# Patient Record
Sex: FEMALE
[De-identification: names, ages, dates, MRNs, and addresses within clinical notes are randomized per-mention and may not be internally consistent; named-entity substitution may affect disease eponyms.]

---

## 2022-01-05 ENCOUNTER — HOSPITAL ENCOUNTER (OUTPATIENT)
Dept: HOSPITAL 5 - TRG | Age: 27
LOS: 1 days | Discharge: HOME | End: 2022-01-06
Attending: OBSTETRICS & GYNECOLOGY
Payer: COMMERCIAL

## 2022-01-05 VITALS — SYSTOLIC BLOOD PRESSURE: 120 MMHG | DIASTOLIC BLOOD PRESSURE: 79 MMHG

## 2022-01-05 DIAGNOSIS — O26.893: Primary | ICD-10-CM

## 2022-01-05 DIAGNOSIS — Z3A.30: ICD-10-CM

## 2022-01-05 DIAGNOSIS — R10.9: ICD-10-CM

## 2022-01-05 PROCEDURE — 59025 FETAL NON-STRESS TEST: CPT

## 2022-01-05 PROCEDURE — 76819 FETAL BIOPHYS PROFIL W/O NST: CPT

## 2022-01-06 NOTE — ULTRASOUND REPORT
ULTRASOUND FETAL BIOPHYSICAL PROFILE



INDICATION / CLINICAL INFORMATION: decreased fetal movement.

Clinical Gestational Age (GA) in weeks, days: 30 weeks 5 days 



TECHNIQUE: Transabdominal.



COMPARISON: None available.



FINDINGS:



FETAL BREATHING MOVEMENT = 2

GROSS BODY MOVEMENT = 2

FETAL TONE = 2

QUALITATIVE AMNIOTIC FLUID VOLUME = 2



TOTAL BIOPHYSICAL SCORE = 8/8



FETAL HEART RATE (beats per minute): 137 



PRESENTATION: Cephalic. 



ADDITIONAL FINDINGS: None.



IMPRESSION:

1. Fetal Biophysical Score = 8/8



Signer Name: Marly Flores MD 

Signed: 1/6/2022 1:08 AM

Workstation Name: [id number]

## 2022-01-30 ENCOUNTER — HOSPITAL ENCOUNTER (OUTPATIENT)
Dept: HOSPITAL 5 - TRG | Age: 27
Discharge: HOME | End: 2022-01-30
Attending: OBSTETRICS & GYNECOLOGY
Payer: COMMERCIAL

## 2022-01-30 VITALS — SYSTOLIC BLOOD PRESSURE: 126 MMHG | DIASTOLIC BLOOD PRESSURE: 65 MMHG

## 2022-01-30 DIAGNOSIS — Z3A.34: ICD-10-CM

## 2022-01-30 DIAGNOSIS — O29.43: Primary | ICD-10-CM

## 2022-01-30 LAB
BILIRUB UR QL STRIP: (no result)
BLOOD UR QL VISUAL: (no result)
PH UR STRIP: 6 [PH] (ref 5–7)
PROT UR STRIP-MCNC: (no result) MG/DL
RBC #/AREA URNS HPF: < 1 /HPF (ref 0–6)
UROBILINOGEN UR-MCNC: < 2 MG/DL (ref ?–2)
WBC #/AREA URNS HPF: < 1 /HPF (ref 0–6)

## 2022-01-30 PROCEDURE — 81001 URINALYSIS AUTO W/SCOPE: CPT

## 2022-02-22 NOTE — HISTORY AND PHYSICAL REPORT
History of Present Illness


Date of examination: 22


Date of admission: 


22 11:58





History of present illness: 


pt presents for post partum problem vsit: c/o Headaches in the back of the head,

shaking, feet and leg swelling, neck pain. BP after checking 165/100. @10:45a 

176/97 per pt...

...................................................................Tamra Silverio 

2022 11:31 AM 


Mask, Patient denies fever, cough, shortness of breath and exposure to COVID-19.

 





Sending patient to L&D for treatment of postpartum pre-e. Bed control called for

admission ................

...................................................Morena Julien CASTRO  

2022 11:44 AM











Vital Signs:





Patient Profile:   26 Years Old Female


Height:      64 inches


Weight:         216 pounds


BMI:      37.07


Temp:      97.6 degrees F


BP sittin / 96  (left arm)





Past Medical History:


   Reviewed and updated today:


      Negative Past Medical History





Past Surgical History:


   Reviewed and updated today:


      Appendectomy (2017)


      Right Shoulder (2018)











Family History Summary: 


Other Family Member - Has No Family History of Ovarvian Cancer - Entered On: 

8/10/2021


Other Family Member - Has No Family History of Colon Cancer - Entered On: 

8/10/2021


Other Family Member - Has No Family History of Breast Cancer - Entered On: 

8/10/2021








Social History:


   Marital Status:( same sex)


   Children: 0


   Occupation: unemployed


   


   Smoking History:


   Patient is a former smoker.








Risk Factors: 


Smoked Tobacco Use:  Former smoker


   Cigarettes:  Yes


      Year Quit:  2021


Smokeless Tobacco Use:  Never


Counseled to Quit/Cut Down:  yes


Passive Smoke Exposure:  no


Caffeine Use:  1 drinks per day


Exercise:  no





No Dietary Counseling Reason: pn yes





Alcohol Use:  yes


   Drinks per day:  social





Drug Use:  no








Past Medical History 


Surgery (Non-gyn): Appendectomy (2017)


Right Shoulder (2018)


Abnormal PAP: negative


Uterine Anomaly: negative





Social Hx: Marital Status:( same sex)


Children: 0


Occupation: unemployed





Smoking History:


Patient is a former smoker.








Infection History 


Hx of STD: none


Partner hx. of genital herpes: no





Genetic History 


 Congenital Heart Defect:


    Mom: no  Dad: no


Canavan Disease:


    Mom: no  Dad: no


Thalassemia


    Mom: no  Dad: no


Neural Tube Defect


    Mom: no  Dad: no


Down's Syndrome


    Mom: no  Dad: no


Nasir-Sachs


    Mom: no  Dad: no


Sickle Cell Disease/Trait


    Mom: no  Dad: no


Hemophilia


    Mom: no  Dad: no


Muscular Dystrophy


    Mom: no  Dad: no


Cystic Fibrosis


    Mom: no  Dad: no


Mon[place] Chorea


    Mom: no  Dad: no


Mental Retardation


    Mom: no  Dad: no


Fragile X


    Mom: no  Dad: no


Other Genetic/Chromosomal Disorder


    Mom: no  Dad: no


Child w/other birth defect


    Mom: no  Dad: no





Enviromental Exposures 


Xray Exposure: no


Medication, drug, or alcohol use since LMP: no


Chemical/Other Exposure: no


Exposure to Cat Liter: yes


Hx of Parvovirus (Fifth Disease): no





Current Allergies (reviewed today):


No known allergies








Past History


Past Medical History: other (SEE HPI)


Past Surgical History: other (SEE HPI)


GYN History: other (SEE HPI)


Social history: other (SEE HPI)





- Obstetrical History


: 1





Medications and Allergies


                                    Allergies











Allergy/AdvReac Type Severity Reaction Status Date / Time


 


No Known Allergies Allergy   Unverified 22 12:35











                                Home Medications











 Medication  Instructions  Recorded  Confirmed  Last Taken  Type


 


Ibuprofen [Motrin 800 MG tab] 800 mg PO Q8HR #30 tablet 22 20:30 Rx











Active Meds: 


Active Medications





Lactated Ringer's (Lactated Ringers)  1,000 mls @ 125 mls/hr IV AS DIRECT SARAH


Magnesium Sulfate (Magnesium Sulfate 4gm/100ml)  4 gm in 100 mls @ 300 mls/hr IV

ONCE ONE


   Stop: 22 12:17


Magnesium Sulfate (Magnesium Sulfate 40gm/1000ml)  40 gm in 1,000 mls @ 50 

mls/hr IV AS DIRECT SARAH


Labetalol HCl (Labetalol 200 Mg Tab)  200 mg PO BID SARAH











Review of Systems


Eyes: blurred vision


Cardiovascular: high blood pressure, leg edema


Breasts: normal


Genitourinary: deferred


Rectal Exam: deferred


Musculoskeletal: neck pain


Neurological: headaches





- Physical Exam


Breasts: Positive: normal


Abdomen: Positive: normal appearance, soft


Extremities: Positive: edema





Results


Result Diagrams: 


                                 22 12:25





                                 22 12:25


All other labs normal.








Assessment and Plan





Pt post  2022, presents to day with complaint of HA, visual d

isturbances, BLE edema. Pt dx with GHTN during pregnancy. new dx pre-e d/t 

cerebral symptoms. Admission orders in EMR. 





- Patient Problems


(1) Pre-eclampsia, postpartum


Current Visit: Yes   Status: Acute   


Plan to address problem: 


Direct admit L&D


Start 24 hr Mag


Labetolol 200mg BID PO 


monitor symptoms 


Strict I&Os

## 2022-02-23 NOTE — XRAY REPORT
XR chest 1V ap



INDICATION / CLINICAL INFORMATION: DECREASED SPO2.



COMPARISON: None available.



FINDINGS:



SUPPORT DEVICES: None.

HEART /PULMONARY VASCULATURE: Heart is accentuated by low lung volumes. Pulmonary vasculature appears
 congested. 

LUNGS / PLEURA: Mild patchy pulmonary opacities are present within the lungs, most pronounced in the 
right lung base. No sizable pleural effusion. No pneumothorax. 



IMPRESSION:

Pulmonary vasculature congestion with mild basilar predominant parenchymal opacities in the lungs, fa
vored to reflect edema. Unable to exclude superimposed pneumonia.



Signer Name: Pramod Potter MD 

Signed: 2/23/2022 12:23 AM

Workstation Name: [id number]-

## 2022-02-23 NOTE — CONSULTATION
History of Present Illness


Consult date: 02/23/22


Consult reason: hypertension, other (Edema)


History of present illness: 





The patient is a 26-year-old woman who completed a pregnancy a week ago.  She 

states that she was induced at 37 weeks, due to progressive, gestational 

hypertension and edema.  Over the past 2 days, she began experiencing increasing

headache and dizziness, which prompted her to take her blood pressure at home.  

Systolic blood pressure was 175 and she presented to her OB's office where the 

severe hypertension was confirmed.  She was subsequently referred to the 

hospital for admission and further evaluation.  This is her first pregnancy, and

she has no prior cardiac history.  There is no chest pain, no shortness of 

breath, no palpitations.  She states that the lower extremity edema has been 

progressive through pregnancy and became worse after her labor.  She is on no 

medications and was not on antihypertensive therapy for her gestational 

hypertension.





Work-up in this hospital so far: An ECG is pending.  Chest x-ray shows an 

overlay of soft tissue, but no evidence of significant interstitial edema, and 

normal sized cardiac silhouette.





Patient is currently on bedrest, appears comfortable with no acute distress.  

Current systolic blood pressure is in the 140s.





Past History


Past Medical History: hypertension


Social history: other (SEE HPI)





Medications and Allergies


                                    Allergies











Allergy/AdvReac Type Severity Reaction Status Date / Time


 


No Known Allergies Allergy   Unverified 02/22/22 12:35











                                Home Medications











 Medication  Instructions  Recorded  Confirmed  Last Taken  Type


 


Ibuprofen [Motrin 800 MG tab] 800 mg PO Q8HR #30 tablet 02/20/22 02/22/22 02/21/22 20:30 Rx











Active Meds: 


Active Medications





Acetaminophen/Butalbital/Caffeine (Butalb/Acetaminophen/Caffeine Tab)  2 tab PO 

Q4H PRN


   PRN Reason: Headache


   Last Admin: 02/23/22 02:23 Dose:  2 tab


   


Benzocaine/Menthol (Benzocaine/Menthol 20/0.5% Top Spray 56 Gm)  1 spray TP TID 

PRN


   PRN Reason: tatum pain


Furosemide (Furosemide 40 Mg/4 Ml Inj)  40 mg IV QDAY SARAH


   Last Admin: 02/23/22 10:23 Dose:  40 mg


   


Ibuprofen (Ibuprofen 800 Mg Tab)  800 mg PO Q8H PRN


   PRN Reason: Pain, Mild (1-3)


   Last Admin: 02/23/22 10:24 Dose:  800 mg


   


Labetalol HCl (Labetalol 200 Mg Tab)  200 mg PO BID SARAH


   Last Admin: 02/23/22 10:21 Dose:  200 mg


   











Review of Systems


Cardiovascular: edema, no chest pain, no orthopnea, no palpitations, no 

rapid/irregular heart beat, no syncope, no lightheadedness, no shortness of 

breath





Physical Examination


                                   Vital Signs











Pulse Ox


 


 89 


 


 02/19/22 19:31











General appearance: no acute distress


HEENT: Positive: PERRL


Neck: Positive: neck supple


Cardiac: Positive: Reg Rate and Rhythm


Lungs: Positive: Decreased Breath Sounds


Neuro: Positive: Grossly Intact


Abdomen: Positive: Soft


Female genitourinary: deferred


Skin: Positive: Clear


Extremities: Present: +1 Edema





Results





                                 02/22/22 12:25





                                 02/23/22 01:41


                                 Cardiac Enzymes











  02/22/22 02/23/22 Range/Units





  12:25 01:41 


 


AST  20  20  (5-40)  units/L


 


Lactate Dehydrogenase  300 H   ()  units/L








                                       CBC











  02/22/22 Range/Units





  12:25 


 


WBC  10.9  (4.5-11.0)  K/mm3


 


RBC  3.30 L  (3.65-5.03)  M/mm3


 


Hgb  10.7  (10.1-14.3)  gm/dl


 


Hct  30.9  (30.3-42.9)  %


 


Plt Count  220  (140-440)  K/mm3








                          Comprehensive Metabolic Panel











  02/22/22 02/23/22 Range/Units





  12:25 01:41 


 


Sodium   143  (137-145)  mmol/L


 


Potassium   3.8  (3.6-5.0)  mmol/L


 


Chloride   108.9 H  ()  mmol/L


 


Carbon Dioxide   17 L  (22-30)  mmol/L


 


BUN   8  (7-17)  mg/dL


 


Creatinine  0.6  0.7  (0.6-1.2)  mg/dL


 


Glucose   96  ()  mg/dL


 


Calcium   7.5 L  (8.4-10.2)  mg/dL


 


AST  20  20  (5-40)  units/L


 


ALT  12  13  (7-56)  units/L


 


Alkaline Phosphatase   152 H  ()  units/L


 


Total Protein   6.0 L  (6.3-8.2)  g/dL


 


Albumin   3.1 L  (3.9-5)  g/dL














Assessment and Plan





- Patient Problems


(1) Uncontrolled hypertension


Current Visit: Yes   Status: Acute   


Plan to address problem: 


Patient who recently completed a pregnancy marked by progressive gestational 

hypertension, admitted with symptoms of uncontrolled hypertension in the 

postpartum.  She has mild lower extremity edema, but no clinical or radiologic 

evidence of pulmonary edema.





We will order a twelve-lead EKG, and echocardiogram for left ventricular 

function and valvular function assessment.  Treatment with diuretics and optimal

antihypertensive management.  Thank you for this consultation.

## 2022-02-23 NOTE — EVENT NOTE
Date: 02/23/22





Patient's cardiac studies as follows:


ECG is normal sinus rhythm, normal ECG.


Echocardiogram shows normal left ventricular systolic function, ejection 

fraction 55-60%, no significant valvular lesions.





No further cardiac work-up, continue blood pressure management and diuretics as 

previously recommended.

## 2022-02-23 NOTE — EVENT NOTE
Date: 02/23/22 (Pt with HA)


Pt with c/o a continued HA mostly occipital and sometime frontal. Denies blurred

vision, spots before her eyes.  States no relief with medications. Will order 

anesthesia consult for spinal HA evaluation.

## 2022-02-23 NOTE — EVENT NOTE
Date: 02/23/22





Anesthesia consulted for possible PDPH.  Patient's chart reviewed, no mention of

wet tap on the procedure note.  Upon evaluation and questioning of the patient, 

she did not exhibit typical S&S of a PDPH.  No postural component to the HA.  

Her options for treatment were presented as conservative management vs EBP.  She

preferred to continue with conservative treatment at this time after discussing 

risks/benefits and likelihood of improvement of symptoms from an EBP.

## 2022-02-23 NOTE — EVENT NOTE
Date: 02/23/22


 Pt noted to be breathing harder than normal, sats 93-98% on RA while sitting up

but decreasing to the 80's while laying down. CXR shows lungs with vascular 

congestion and edema. Dr. Stockton made aware. Magnesium sulfate d/c'd. Lasix 

40MG qday along with HEMALATHA. Jhonathan to remain in place for accurate I&O. Cardiology 

consulted. Discussed plan of care with patient, all questions addressed.

## 2022-02-23 NOTE — ELECTROCARDIOGRAPH REPORT
[facility]

                                       

Test Date:    2022               Test Time:    10:41:42

Pat Name:     JORGE YUAN           Department:   

Patient ID:   [id number]          Room:         2009

Gender:       F                        Technician:   YENI

:          1995               Requested By: SEVERIANO MELLO

Order Number: [id number]              Reading MD:   Garth Bob

                                 Measurements

Intervals                              Axis          

Rate:         75                       P:            16

SC:           126                      QRS:          26

QRSD:         83                       T:            30

QT:           414                                    

QTc:          462                                    

                           Interpretive Statements

Sinus rhythm

No previous ECG available for comparison

Electronically Signed On 2022 10:58:19 EST by Garth Bob

## 2022-02-24 NOTE — DISCHARGE SUMMARY
Providers





- Providers


Date of Admission: 


02/22/22 11:58





Date of discharge: 02/24/22


Attending physician: 


AMISHA ZAIDI





                                        





02/23/22 01:41


Consult to Physician [CONS] Routine 


   Comment: 


   Consulting Provider: SEVERIANO MELLO


   Physician Instructions: 


   Reason For Exam: Pulomonary edema;postpartum pre-e,postpartum day 4





02/23/22 17:45


Consult to Anesthesiology [CONS] Urgent 


   Consulting Provider: REGINALDO GROSS


   Reason For Exam: Possible spinal headache











Primary care physician: 


LEYDA HANDLEY MD








Hospitalization


Reason for admission: other (postpartum pre-e)


Postpartum complications: other (postpartum pre-e)


Hospital course: 


 blood pressure management, blood patch for spinal HA





Condition at discharge: Good


Disposition: 01 HOME / SELF CARE / HOMELESS





- Discharge Diagnoses


(1) Pre-eclampsia, postpartum


Status: Acute   





Plan





- Discharge Medications


Prescriptions: 


labetaloL [Labetalol 200mg TAB] 200 mg PO BID #60 tab


Furosemide [Lasix TAB] 40 mg PO QDAY #5 tablet


NIFEdipine XL [Procardia Xl] 30 mg PO QDAY #30 tablet





- Provider Discharge Summary


Activity: routine, no sex for 6 weeks, no heavy lifting 4 weeks, no strenuous 

exercise


Diet: other (low sodium)


Additional instructions: 


[]  Smoking cessation referral if applicable(refer to patient education folder 

for contact #)


[]  Refer to [facility] Booklet








Call your doctor immediately for:


* Fever > 100.5


* Heavy vaginal bleeding ( >1 pad per hour)


* Severe persistent headache


* Shortness of breath


* Reddened, hot, painful area to leg or breast


* Drainage or odor from incision.





* Keep incision clean and dry at all times and follow doctor's instructions 

regarding bathing/showering











- Follow up plan


Follow up: 


LEYDA HANDLEY MD [Primary Care Provider] - 7 Days (Please call [phone]

to schedule appointment in office for blood pressure check early next week. 

Continue to check your blood pressure at home and call office for any readings 

greater than 140/90.  Your prescriptions have been sent to the [facility] 

in [place].)